# Patient Record
Sex: MALE | Race: WHITE | NOT HISPANIC OR LATINO | Employment: FULL TIME | URBAN - METROPOLITAN AREA
[De-identification: names, ages, dates, MRNs, and addresses within clinical notes are randomized per-mention and may not be internally consistent; named-entity substitution may affect disease eponyms.]

---

## 2020-06-29 ENCOUNTER — OFFICE VISIT (OUTPATIENT)
Dept: OBGYN CLINIC | Facility: CLINIC | Age: 35
End: 2020-06-29
Payer: COMMERCIAL

## 2020-06-29 VITALS
WEIGHT: 250 LBS | BODY MASS INDEX: 33.86 KG/M2 | TEMPERATURE: 99.2 F | DIASTOLIC BLOOD PRESSURE: 70 MMHG | SYSTOLIC BLOOD PRESSURE: 131 MMHG | HEIGHT: 72 IN

## 2020-06-29 DIAGNOSIS — M95.8 OSTEOCHONDRAL DEFECT OF FEMORAL CONDYLE: Primary | ICD-10-CM

## 2020-06-29 PROCEDURE — 99203 OFFICE O/P NEW LOW 30 MIN: CPT | Performed by: ORTHOPAEDIC SURGERY

## 2020-06-29 NOTE — PROGRESS NOTES
Orthopaedic Surgery Note    CC: Right Knee Pain      HPI:  Pablo Kirby is a 29 y o male with a history of right knee pain  Patient reports that he was digging a hole on 6/27 when he had the right knee trailing and felt a "pop" in the knee and felt as though he had been struck by an object  Reports that he asked his partner, "what just hit me?"  He had immediate pain in the right knee, reports that he felt it was mostly anterolateral aspect of the knee and did develop swelling and effusion  Went to outside hospital ED [facility]) and radiographs were obtained  He was sent her for followup  Reports ED did not really provide much more information  He dscribes the pain as aching, sharp, dull, stiffness, swelling, instability  The pain is severe and an 8 of 10  Worse with moving, better with staying still  It is unchanged over time  It is interfering with his work, hobbies, ADLs  It is waking him from sleep  Denied calf pain  Has tried taking NSAIDs, compression, icing - these provide some pain relief  Of note, he has history significant for multiple reports of patella dislocation/subluxation as teenager (x4 episodes between age [age])  He did undergo Issac procedure for recurrent patella instability around age [age] with Surgeon at [facility]   Reports that up until this episode he has not had significant problems with the right knee following hte surgery  ALLERGIES:  No Known Allergies    CURRENT MEDICATIONS:  No current outpatient medications on file  No current facility-administered medications for this visit  PAST MEDICAL HISTORY  History reviewed  No pertinent past medical history  SURGICAL HISTORY  Past Surgical History:   Procedure Laterality Date    KNEE SURGERY         FAMILY HISTORY  History reviewed  No pertinent family history      SOCIAL HISTORY  Social History     Socioeconomic History    Marital status:      Spouse name: Not on file    Number of children: Not on file    Years of education: Not on file    Highest education level: Not on file   Occupational History    Not on file   Social Needs    Financial resource strain: Not on file    Food insecurity:     Worry: Not on file     Inability: Not on file    Transportation needs:     Medical: Not on file     Non-medical: Not on file   Tobacco Use    Smoking status: Current Every Day Smoker    Smokeless tobacco: Never Used   Substance and Sexual Activity    Alcohol use: Not on file    Drug use: Not on file    Sexual activity: Not on file   Lifestyle    Physical activity:     Days per week: Not on file     Minutes per session: Not on file    Stress: Not on file   Relationships    Social connections:     Talks on phone: Not on file     Gets together: Not on file     Attends Hindu service: Not on file     Active member of club or organization: Not on file     Attends meetings of clubs or organizations: Not on file     Relationship status: Not on file    Intimate partner violence:     Fear of current or ex partner: Not on file     Emotionally abused: Not on file     Physically abused: Not on file     Forced sexual activity: Not on file   Other Topics Concern    Not on file   Social History Narrative    Not on file         Review of Systems   Metal Allergy: no  History of MRSA: no  History of DVT or PE: no  Active dental issues: no  Anesthesia complications: no    Patient indicated positive for tooth pain, joint pain, back pain, non-healing sores (LLE)  Otherwise negative except per above and HPI  Physical Exam    Vitals  Vitals:    06/29/20 1052   BP: 131/70   Temp: 99 2 °F (37 3 °C)       BMI  Body mass index is 33 91 kg/m²  GENERAL: No acute distress  Alert and oriented  Well nourished and well hydrated  Appears stated age  HEENT : Normocephalic, atraumatic  Extraocular movements intact  Mask in place  NECK: Supple, trachea midline    LUNGS: Adequate and symmetric respiratory effort  No intercostal retractions or accessory muscle use  HEART: Extremities warm and perfused  ABDOMEN: Nondistended  SKIN: Warm and dry, no rash  Right Knee   Inspection/Appearance:       Swelling: Yes      Patella is midline  Alignment:  Knee is in mild valgus  Palpation - Soft Tissue: significant effusion  ROM:       Extension - 0     (5/5 strength, no palpable defect in extensor mechanism)      Flexion - 80 (limited by pain)      extensor lag: no    Stability:  demonstrates no varus, valgus, or posterior drawer  Equivocal anterior draw and Jeff (limited by patient discomfort and gaurding)  Patella: stable, tracks normally  No calf swelling, calf compressible, no significant pain with compression  Imaging  A) Imaging modality available  Radiographs: yes  MRI scan: no  CT scan: no    B) Imaging findings  Mild narrowing of lateral compartment  There is an osteochondral defect of the lateral femoral condyle noted  Screws in place from prior patella realignment procedure  No obvious fracture or dislocation  Assessment and Plan  Right knee osteochondral defect    - discussed with patient that he has significant swelling and pain, which is consistent with injury  This could be a meniscal or ligamentous injury, however, his knee does feel stable on exam but exam can be unreliable in context of pain and gaurding in light of acute injury  This could also be that his OCD lesion became unstable and may be a loose body at this time     - Recommend rest, ice, compression, NSAIDs   - MRI without contrast ordered  I would like patietn to followup with Dr Cassandra Baugh to review MRI  We did discuss aspirating fluid from knee but I think this would just reaccumulate given that the underlying etiology is uncertain at this time  Kaushik Hu MD  Adult Reconstruction Surgery  Department of [place] [id number]  11:15 AM

## 2020-07-06 ENCOUNTER — HOSPITAL ENCOUNTER (OUTPATIENT)
Dept: MRI IMAGING | Facility: HOSPITAL | Age: 35
Discharge: HOME | End: 2020-07-06
Attending: ORTHOPAEDIC SURGERY
Payer: COMMERCIAL

## 2020-07-06 DIAGNOSIS — M95.8 OSTEOCHONDRAL DEFECT OF FEMORAL CONDYLE: ICD-10-CM

## 2020-07-06 PROCEDURE — 73721 MRI JNT OF LWR EXTRE W/O DYE: CPT

## 2024-06-30 ENCOUNTER — HOSPITAL ENCOUNTER (EMERGENCY)
Dept: HOSPITAL 99 - EMR | Age: 39
Discharge: HOME | End: 2024-06-30
Payer: COMMERCIAL

## 2024-06-30 VITALS — SYSTOLIC BLOOD PRESSURE: 146 MMHG | DIASTOLIC BLOOD PRESSURE: 121 MMHG

## 2024-06-30 VITALS — RESPIRATION RATE: 18 BRPM | SYSTOLIC BLOOD PRESSURE: 159 MMHG | DIASTOLIC BLOOD PRESSURE: 88 MMHG

## 2024-06-30 VITALS — BODY MASS INDEX: 45.5 KG/M2

## 2024-06-30 VITALS — DIASTOLIC BLOOD PRESSURE: 98 MMHG | SYSTOLIC BLOOD PRESSURE: 157 MMHG

## 2024-06-30 VITALS — DIASTOLIC BLOOD PRESSURE: 79 MMHG | SYSTOLIC BLOOD PRESSURE: 142 MMHG

## 2024-06-30 DIAGNOSIS — I83.028: Primary | ICD-10-CM

## 2024-06-30 DIAGNOSIS — L97.829: ICD-10-CM

## 2024-06-30 DIAGNOSIS — R03.0: ICD-10-CM

## 2024-06-30 DIAGNOSIS — F17.210: ICD-10-CM

## 2024-06-30 DIAGNOSIS — L03.116: ICD-10-CM

## 2024-06-30 LAB
ALBUMIN SERPL-MCNC: 3.9 G/DL (ref 3.5–5)
ALP SERPL-CCNC: 111 U/L (ref 38–126)
ALT SERPL-CCNC: 76 U/L (ref 0–50)
AST SERPL-CCNC: 93 U/L (ref 17–59)
BUN SERPL-MCNC: 11 MG/DL (ref 9–20)
CALCIUM SERPL-MCNC: 8.8 MG/DL (ref 8.4–10.2)
CHLORIDE SERPL-SCNC: 106 MMOL/L (ref 98–107)
CO2 SERPL-SCNC: 26 MMOL/L (ref 22–30)
EGFR: > 60
ERYTHROCYTE [DISTWIDTH] IN BLOOD BY AUTOMATED COUNT: 13.3 % (ref 11.5–14.5)
ESTIMATED CREATININE CLEARANCE: > 125 ML/MIN
GLUCOSE SERPL-MCNC: 105 MG/DL (ref 70–99)
HCT VFR BLD AUTO: 34.6 % (ref 39–52)
HGB BLD-MCNC: 12 G/DL (ref 13–18)
MCHC RBC AUTO-ENTMCNC: 34.7 G/DL (ref 33–37)
MCV RBC AUTO: 87.4 FL (ref 80–94)
NRBC BLD AUTO-RTO: 0 %
PLATELET # BLD AUTO: 231 10^3/UL (ref 130–400)
POTASSIUM SERPL-SCNC: 3.9 MMOL/L (ref 3.5–5.1)
PROT SERPL-MCNC: 7.2 G/DL (ref 6.3–8.2)
SODIUM SERPL-SCNC: 139 MMOL/L (ref 135–145)

## 2024-06-30 PROCEDURE — 99284 EMERGENCY DEPT VISIT MOD MDM: CPT

## 2025-04-03 ENCOUNTER — HOSPITAL ENCOUNTER (INPATIENT)
Dept: HOSPITAL 99 - 4 WEST ACU | Age: 40
LOS: 2 days | Discharge: ANOTHER HOSPITAL | DRG: 896 | End: 2025-04-05
Payer: COMMERCIAL

## 2025-04-03 VITALS — BODY MASS INDEX: 43.3 KG/M2 | BODY MASS INDEX: 41.6 KG/M2 | BODY MASS INDEX: 42.1 KG/M2

## 2025-04-03 VITALS — SYSTOLIC BLOOD PRESSURE: 142 MMHG | DIASTOLIC BLOOD PRESSURE: 61 MMHG

## 2025-04-03 VITALS — DIASTOLIC BLOOD PRESSURE: 66 MMHG | SYSTOLIC BLOOD PRESSURE: 145 MMHG

## 2025-04-03 VITALS — SYSTOLIC BLOOD PRESSURE: 143 MMHG | DIASTOLIC BLOOD PRESSURE: 66 MMHG

## 2025-04-03 VITALS — DIASTOLIC BLOOD PRESSURE: 90 MMHG | SYSTOLIC BLOOD PRESSURE: 172 MMHG

## 2025-04-03 DIAGNOSIS — I87.8: ICD-10-CM

## 2025-04-03 DIAGNOSIS — Z56.0: ICD-10-CM

## 2025-04-03 DIAGNOSIS — T39.315A: ICD-10-CM

## 2025-04-03 DIAGNOSIS — K76.6: ICD-10-CM

## 2025-04-03 DIAGNOSIS — D64.9: ICD-10-CM

## 2025-04-03 DIAGNOSIS — R18.8: ICD-10-CM

## 2025-04-03 DIAGNOSIS — F11.21: ICD-10-CM

## 2025-04-03 DIAGNOSIS — K64.8: ICD-10-CM

## 2025-04-03 DIAGNOSIS — L97.329: ICD-10-CM
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
## 2025-04-03 DIAGNOSIS — N17.9: ICD-10-CM

## 2025-04-03 DIAGNOSIS — R16.2: ICD-10-CM

## 2025-04-03 DIAGNOSIS — E88.09: ICD-10-CM

## 2025-04-03 DIAGNOSIS — K21.9: ICD-10-CM

## 2025-04-03 DIAGNOSIS — F17.200: ICD-10-CM

## 2025-04-03 DIAGNOSIS — F10.139: Primary | ICD-10-CM

## 2025-04-03 DIAGNOSIS — D69.59: ICD-10-CM

## 2025-04-03 DIAGNOSIS — E66.01: ICD-10-CM

## 2025-04-03 DIAGNOSIS — K76.0: ICD-10-CM

## 2025-04-03 DIAGNOSIS — I83.023: ICD-10-CM

## 2025-04-03 DIAGNOSIS — K71.8: ICD-10-CM

## 2025-04-03 DIAGNOSIS — F32.A: ICD-10-CM

## 2025-04-03 DIAGNOSIS — K72.00: ICD-10-CM
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
## 2025-04-03 DIAGNOSIS — T39.1X5A: ICD-10-CM

## 2025-04-03 DIAGNOSIS — D68.4: ICD-10-CM

## 2025-04-03 LAB
ALBUMIN SERPL-MCNC: 2.8 G/DL (ref 3.5–5)
ALP SERPL-CCNC: 170 U/L (ref 38–126)
ALT SERPL-CCNC: 44 U/L (ref 0–50)
ANISOCYTOSIS BLD QL: (no result)
APAP SERPL-MCNC: < 10 UG/ML (ref 10–30)
AST SERPL-CCNC: 167 U/L (ref 17–59)
BUN SERPL-MCNC: 11 MG/DL (ref 9–20)
CALCIUM SERPL-MCNC: 8.3 MG/DL (ref 8.4–10.2)
CHLORIDE SERPL-SCNC: 106 MMOL/L (ref 98–107)
CO2 SERPL-SCNC: 16 MMOL/L (ref 22–30)
GLUCOSE SERPL-MCNC: 108 MG/DL (ref 70–99)
HCT VFR BLD AUTO: 28.3 % (ref 39–52)
HGB BLD-MCNC: 9.8 G/DL (ref 13–18)
HYPOCHROMIA BLD QL: (no result)
INR PPP: 3.15
LIPASE SERPL-CCNC: 624 U/L (ref 23–300)
MCHC RBC AUTO-ENTMCNC: 34.6 G/DL (ref 33–37)
MCV RBC AUTO: 83.5 FL (ref 80–94)
NRBC BLD AUTO-RTO: 0 %
PLATELET # BLD AUTO: 81 10^3/UL (ref 130–400)
POTASSIUM SERPL-SCNC: 3.8 MMOL/L (ref 3.5–5.1)
PROT SERPL-MCNC: 8.7 G/DL (ref 6.3–8.2)
PROTHROMBIN TIME: 32.7 SEC (ref 11.4–14.6)
SALICYLATES SERPL-MCNC: < 1 MG/DL (ref 2–20)
SODIUM SERPL-SCNC: 138 MMOL/L (ref 135–145)
TARGETS BLD QL SMEAR: (no result)

## 2025-04-03 SDOH — ECONOMIC STABILITY - INCOME SECURITY: UNEMPLOYMENT, UNSPECIFIED: Z56.0

## 2025-04-04 VITALS — DIASTOLIC BLOOD PRESSURE: 97 MMHG | SYSTOLIC BLOOD PRESSURE: 150 MMHG

## 2025-04-04 VITALS — DIASTOLIC BLOOD PRESSURE: 96 MMHG | SYSTOLIC BLOOD PRESSURE: 179 MMHG

## 2025-04-04 VITALS — SYSTOLIC BLOOD PRESSURE: 156 MMHG | DIASTOLIC BLOOD PRESSURE: 94 MMHG

## 2025-04-04 VITALS — DIASTOLIC BLOOD PRESSURE: 96 MMHG | SYSTOLIC BLOOD PRESSURE: 140 MMHG

## 2025-04-04 VITALS — DIASTOLIC BLOOD PRESSURE: 85 MMHG | SYSTOLIC BLOOD PRESSURE: 154 MMHG

## 2025-04-04 LAB
ALBUMIN SERPL-MCNC: 2.5 G/DL (ref 3.5–5)
ALBUMIN SERPL-MCNC: 2.7 G/DL (ref 3.5–5)
ALP SERPL-CCNC: 138 U/L (ref 38–126)
ALP SERPL-CCNC: 67 U/L (ref 38–126)
ALT SERPL-CCNC: 44 U/L (ref 0–50)
ALT SERPL-CCNC: 45 U/L (ref 0–50)
AST SERPL-CCNC: 141 U/L (ref 17–59)
AST SERPL-CCNC: 153 U/L (ref 17–59)
BUN SERPL-MCNC: 12 MG/DL (ref 9–20)
BUN SERPL-MCNC: 14 MG/DL (ref 9–20)
CALCIUM SERPL-MCNC: 8.1 MG/DL (ref 8.4–10.2)
CHLORIDE SERPL-SCNC: 106 MMOL/L (ref 98–107)
CHLORIDE SERPL-SCNC: 108 MMOL/L (ref 98–107)
CO2 SERPL-SCNC: 16 MMOL/L (ref 22–30)
CO2 SERPL-SCNC: 21 MMOL/L (ref 22–30)
EGFR: > 60
EGFR: > 60
ERYTHROCYTE [DISTWIDTH] IN BLOOD BY AUTOMATED COUNT: 25.3 % (ref 11.5–14.5)
ERYTHROCYTE [DISTWIDTH] IN BLOOD BY AUTOMATED COUNT: 25.6 % (ref 11.5–14.5)
ESTIMATED CREATININE CLEARANCE: 105 ML/MIN
ESTIMATED CREATININE CLEARANCE: 105 ML/MIN
FOLATE SERPL-MCNC: 2.9 NG/ML (ref 2.76–20)
GGT SERPL-CCNC: 116 U/L (ref 15–73)
GLUCOSE SERPL-MCNC: 80 MG/DL (ref 70–99)
GLUCOSE SERPL-MCNC: 88 MG/DL (ref 70–99)
HCT VFR BLD AUTO: 25.3 % (ref 39–52)
HCT VFR BLD AUTO: 26.2 % (ref 39–52)
HGB BLD-MCNC: 8.9 G/DL (ref 13–18)
HGB BLD-MCNC: 9.3 G/DL (ref 13–18)
INR PPP: 3.75
IRON SERPL-MCNC: 155 UG/DL (ref 49–181)
MCHC RBC AUTO-ENTMCNC: 35.2 G/DL (ref 33–37)
MCHC RBC AUTO-ENTMCNC: 35.5 G/DL (ref 33–37)
MCV RBC AUTO: 82.1 FL (ref 80–94)
MCV RBC AUTO: 83.4 FL (ref 80–94)
PLATELET # BLD AUTO: 78 10^3/UL (ref 130–400)
PLATELET # BLD AUTO: 82 10^3/UL (ref 130–400)
POTASSIUM SERPL-SCNC: 3.8 MMOL/L (ref 3.5–5.1)
POTASSIUM SERPL-SCNC: 3.9 MMOL/L (ref 3.5–5.1)
PROT SERPL-MCNC: 8.5 G/DL (ref 6.3–8.2)
PROTHROMBIN TIME: 34.2 SEC (ref 11.4–14.6)
PROTHROMBIN TIME: 36.7 SEC (ref 11.4–14.6)
SODIUM SERPL-SCNC: 138 MMOL/L (ref 135–145)
SODIUM SERPL-SCNC: 138 MMOL/L (ref 135–145)
TIBC SERPL-MCNC: 200 UG/DL (ref 261–462)
URINE RED BLOOD CELL: (no result) /HPF (ref 0–2)
URINE WHITE CELL CAST: (no result) /LPF
VIT B12 SERPL-MCNC: > 1000 PG/ML (ref 239–931)

## 2025-04-04 RX ADMIN — THIAMINE HYDROCHLORIDE 200 MG: 100 INJECTION, SOLUTION INTRAMUSCULAR; INTRAVENOUS at 08:53

## 2025-04-04 RX ADMIN — WATER 10 ML: 1 INJECTION INTRAMUSCULAR; INTRAVENOUS; SUBCUTANEOUS at 18:00

## 2025-04-04 RX ADMIN — ACETYLCYSTEINE 1050 MG: 200 INJECTION, SOLUTION INTRAVENOUS at 16:47

## 2025-04-04 RX ADMIN — PANTOPRAZOLE SODIUM 40 MG: 40 TABLET, DELAYED RELEASE ORAL at 08:53

## 2025-04-04 RX ADMIN — CEFTRIAXONE 1000 MG: 1 INJECTION, POWDER, FOR SOLUTION INTRAMUSCULAR; INTRAVENOUS at 18:00

## 2025-04-04 RX ADMIN — ACETYLCYSTEINE 525 MG: 200 INJECTION, SOLUTION INTRAVENOUS at 11:02

## 2025-04-04 RX ADMIN — THIAMINE HYDROCHLORIDE 200 MG: 100 INJECTION, SOLUTION INTRAMUSCULAR; INTRAVENOUS at 19:08

## 2025-04-04 RX ADMIN — FOLIC ACID 1 MG: 1 TABLET ORAL at 08:53

## 2025-04-04 RX ADMIN — SODIUM CHLORIDE 1000: 900 INJECTION, SOLUTION INTRAVENOUS at 09:05

## 2025-04-04 RX ADMIN — SODIUM CHLORIDE 1000: 900 INJECTION, SOLUTION INTRAVENOUS at 01:11

## 2025-04-04 RX ADMIN — PHYTONADIONE 10 MG: 10 INJECTION, EMULSION INTRAMUSCULAR; INTRAVENOUS; SUBCUTANEOUS at 11:02

## 2025-04-04 RX ADMIN — ACETYLCYSTEINE 275 MG: 200 INJECTION, SOLUTION INTRAVENOUS at 08:51

## 2025-04-05 VITALS — SYSTOLIC BLOOD PRESSURE: 148 MMHG | DIASTOLIC BLOOD PRESSURE: 97 MMHG

## 2025-04-05 VITALS — SYSTOLIC BLOOD PRESSURE: 152 MMHG | DIASTOLIC BLOOD PRESSURE: 101 MMHG

## 2025-04-05 LAB
ALBUMIN SERPL-MCNC: 2.4 G/DL (ref 3.5–5)
ALP SERPL-CCNC: 114 U/L (ref 38–126)
ALT SERPL-CCNC: 40 U/L (ref 0–50)
AST SERPL-CCNC: 129 U/L (ref 17–59)
BUN SERPL-MCNC: 11 MG/DL (ref 9–20)
CALCIUM SERPL-MCNC: 8.2 MG/DL (ref 8.4–10.2)
CHLORIDE SERPL-SCNC: 107 MMOL/L (ref 98–107)
CO2 SERPL-SCNC: 23 MMOL/L (ref 22–30)
EGFR: > 60
ERYTHROCYTE [DISTWIDTH] IN BLOOD BY AUTOMATED COUNT: 25.2 % (ref 11.5–14.5)
ESTIMATED CREATININE CLEARANCE: > 125 ML/MIN
GLUCOSE SERPL-MCNC: 82 MG/DL (ref 70–99)
HCT VFR BLD AUTO: 25.8 % (ref 39–52)
HGB BLD-MCNC: 8.8 G/DL (ref 13–18)
INR PPP: 3.45
MCHC RBC AUTO-ENTMCNC: 34.1 G/DL (ref 33–37)
MCV RBC AUTO: 84.6 FL (ref 80–94)
NRBC BLD AUTO-RTO: 0 %
PLATELET # BLD AUTO: 76 10^3/UL (ref 130–400)
POTASSIUM SERPL-SCNC: 3.6 MMOL/L (ref 3.5–5.1)
PROT SERPL-MCNC: 7.5 G/DL (ref 6.3–8.2)
PROTHROMBIN TIME: 34.5 SEC (ref 11.4–14.6)
SODIUM SERPL-SCNC: 139 MMOL/L (ref 135–145)

## 2025-04-05 RX ADMIN — THIAMINE HYDROCHLORIDE 200 MG: 100 INJECTION, SOLUTION INTRAMUSCULAR; INTRAVENOUS at 08:23

## 2025-04-05 RX ADMIN — PHYTONADIONE 10 MG: 10 INJECTION, EMULSION INTRAMUSCULAR; INTRAVENOUS; SUBCUTANEOUS at 13:07

## 2025-04-05 RX ADMIN — PREDNISOLONE SODIUM PHOSPHATE 40 MG: 15 SOLUTION ORAL at 12:12

## 2025-04-05 RX ADMIN — PANTOPRAZOLE SODIUM 40 MG: 40 TABLET, DELAYED RELEASE ORAL at 08:23

## 2025-04-05 RX ADMIN — SODIUM CHLORIDE: 900 INJECTION, SOLUTION INTRAVENOUS at 08:22

## 2025-04-05 RX ADMIN — FOLIC ACID 1 MG: 1 TABLET ORAL at 08:23

## 2025-04-07 LAB
LKM-1 IGG SER QL: 1.8 U (ref 0–24.9)
MITOCHONDRIA M2 IGG SER-ACNC: 4.4 UNITS (ref 0–24.9)

## 2025-04-26 ENCOUNTER — HOSPITAL ENCOUNTER (EMERGENCY)
Dept: HOSPITAL 99 - EMR | Age: 40
Discharge: HOME | End: 2025-04-26
Payer: COMMERCIAL

## 2025-04-26 VITALS — SYSTOLIC BLOOD PRESSURE: 176 MMHG | DIASTOLIC BLOOD PRESSURE: 99 MMHG

## 2025-04-26 VITALS — SYSTOLIC BLOOD PRESSURE: 142 MMHG | DIASTOLIC BLOOD PRESSURE: 86 MMHG

## 2025-04-26 DIAGNOSIS — K70.30: Primary | ICD-10-CM

## 2025-04-26 DIAGNOSIS — R60.0: ICD-10-CM

## 2025-04-26 DIAGNOSIS — F17.200: ICD-10-CM

## 2025-04-26 DIAGNOSIS — K70.9: ICD-10-CM

## 2025-04-26 LAB
ALBUMIN SERPL-MCNC: 2.6 G/DL (ref 3.5–5)
ALP SERPL-CCNC: 125 U/L (ref 38–126)
ALT SERPL-CCNC: 124 U/L (ref 0–50)
ANISOCYTOSIS BLD QL: (no result)
APTT PPP: 41.7 SEC (ref 23.4–35)
AST SERPL-CCNC: 153 U/L (ref 17–59)
BUN SERPL-MCNC: 17 MG/DL (ref 9–20)
CALCIUM SERPL-MCNC: 7.8 MG/DL (ref 8.4–10.2)
CHLORIDE SERPL-SCNC: 105 MMOL/L (ref 98–107)
CO2 SERPL-SCNC: 21 MMOL/L (ref 22–30)
EGFR: > 60
ERYTHROCYTE [DISTWIDTH] IN BLOOD BY AUTOMATED COUNT: 23.5 % (ref 11.5–14.5)
GLUCOSE SERPL-MCNC: 169 MG/DL (ref 70–99)
HCT VFR BLD AUTO: 27.3 % (ref 39–52)
HYPOCHROMIA BLD QL: (no result)
INR PPP: 2.65
MCV RBC AUTO: 96.5 FL (ref 80–94)
MICROCYTOSIS: (no result)
NRBC BLD AUTO-RTO: 0 %
PLATELET # BLD AUTO: 61 10^3/UL (ref 130–400)
POTASSIUM SERPL-SCNC: 3.7 MMOL/L (ref 3.5–5.1)
PROT SERPL-MCNC: 7.6 G/DL (ref 6.3–8.2)
PROTHROMBIN TIME: 28.2 SEC (ref 11.4–14.6)
SODIUM SERPL-SCNC: 137 MMOL/L (ref 135–145)

## 2025-04-26 PROCEDURE — 96374 THER/PROPH/DIAG INJ IV PUSH: CPT

## 2025-04-26 PROCEDURE — 99284 EMERGENCY DEPT VISIT MOD MDM: CPT

## 2025-04-26 RX ADMIN — FUROSEMIDE 40 MG: 10 INJECTION, SOLUTION INTRAMUSCULAR; INTRAVENOUS at 19:35

## 2025-05-06 ENCOUNTER — HOSPITAL ENCOUNTER (EMERGENCY)
Dept: HOSPITAL 99 - EMR | Age: 40
Discharge: HOME | End: 2025-05-06
Payer: COMMERCIAL

## 2025-05-06 VITALS — DIASTOLIC BLOOD PRESSURE: 81 MMHG | SYSTOLIC BLOOD PRESSURE: 143 MMHG

## 2025-05-06 VITALS — DIASTOLIC BLOOD PRESSURE: 80 MMHG | SYSTOLIC BLOOD PRESSURE: 160 MMHG

## 2025-05-06 VITALS — BODY MASS INDEX: 48 KG/M2

## 2025-05-06 VITALS — SYSTOLIC BLOOD PRESSURE: 157 MMHG | DIASTOLIC BLOOD PRESSURE: 94 MMHG

## 2025-05-06 VITALS — SYSTOLIC BLOOD PRESSURE: 149 MMHG | DIASTOLIC BLOOD PRESSURE: 66 MMHG

## 2025-05-06 DIAGNOSIS — N17.9: ICD-10-CM

## 2025-05-06 DIAGNOSIS — F17.200: ICD-10-CM

## 2025-05-06 DIAGNOSIS — R18.8: ICD-10-CM

## 2025-05-06 DIAGNOSIS — K74.60: ICD-10-CM

## 2025-05-06 DIAGNOSIS — E83.51: ICD-10-CM

## 2025-05-06 DIAGNOSIS — L03.115: Primary | ICD-10-CM

## 2025-05-06 DIAGNOSIS — R60.0: ICD-10-CM

## 2025-05-06 LAB
ALBUMIN SERPL-MCNC: 2.5 G/DL (ref 3.5–5)
ALP SERPL-CCNC: 138 U/L (ref 38–126)
ALT SERPL-CCNC: 86 U/L (ref 0–50)
AST SERPL-CCNC: 106 U/L (ref 17–59)
BUN SERPL-MCNC: 16 MG/DL (ref 9–20)
CALCIUM SERPL-MCNC: 7.6 MG/DL (ref 8.4–10.2)
CHLORIDE SERPL-SCNC: 102 MMOL/L (ref 98–107)
CO2 SERPL-SCNC: 28 MMOL/L (ref 22–30)
EGFR: > 60
ERYTHROCYTE [DISTWIDTH] IN BLOOD BY AUTOMATED COUNT: 19.9 % (ref 11.5–14.5)
GLUCOSE SERPL-MCNC: 125 MG/DL (ref 70–99)
HCT VFR BLD AUTO: 26.1 % (ref 39–52)
HGB BLD-MCNC: 8.6 G/DL (ref 13–18)
INR PPP: 2.57
MCV RBC AUTO: 98.1 FL (ref 80–94)
NRBC BLD AUTO-RTO: 0.2 %
PLATELET # BLD AUTO: 54 10^3/UL (ref 130–400)
POTASSIUM SERPL-SCNC: 3.5 MMOL/L (ref 3.5–5.1)
PROT SERPL-MCNC: 7.2 G/DL (ref 6.3–8.2)
PROTHROMBIN TIME: 27.6 SEC (ref 11.4–14.6)
SODIUM SERPL-SCNC: 136 MMOL/L (ref 135–145)

## 2025-05-06 PROCEDURE — 99283 EMERGENCY DEPT VISIT LOW MDM: CPT

## 2025-05-06 RX ADMIN — CEPHALEXIN 500 MG: 500 CAPSULE ORAL at 20:18
